# Patient Record
Sex: FEMALE | Race: BLACK OR AFRICAN AMERICAN | NOT HISPANIC OR LATINO | Employment: OTHER | URBAN - METROPOLITAN AREA
[De-identification: names, ages, dates, MRNs, and addresses within clinical notes are randomized per-mention and may not be internally consistent; named-entity substitution may affect disease eponyms.]

---

## 2023-10-02 ENCOUNTER — OFFICE VISIT (OUTPATIENT)
Dept: CARDIOLOGY | Facility: HOSPITAL | Age: 73
End: 2023-10-02
Payer: MEDICARE

## 2023-10-02 VITALS
DIASTOLIC BLOOD PRESSURE: 73 MMHG | BODY MASS INDEX: 30.97 KG/M2 | HEIGHT: 64 IN | HEART RATE: 55 BPM | OXYGEN SATURATION: 97 % | WEIGHT: 181.4 LBS | SYSTOLIC BLOOD PRESSURE: 134 MMHG

## 2023-10-02 DIAGNOSIS — I10 ESSENTIAL HYPERTENSION: ICD-10-CM

## 2023-10-02 DIAGNOSIS — E05.00 GRAVES DISEASE: ICD-10-CM

## 2023-10-02 DIAGNOSIS — R00.2 PALPITATIONS: Primary | ICD-10-CM

## 2023-10-02 PROCEDURE — 99213 OFFICE O/P EST LOW 20 MIN: CPT | Performed by: INTERNAL MEDICINE

## 2023-10-02 PROCEDURE — 1159F MED LIST DOCD IN RCRD: CPT | Performed by: INTERNAL MEDICINE

## 2023-10-02 PROCEDURE — 99203 OFFICE O/P NEW LOW 30 MIN: CPT | Performed by: INTERNAL MEDICINE

## 2023-10-02 PROCEDURE — 3078F DIAST BP <80 MM HG: CPT | Performed by: INTERNAL MEDICINE

## 2023-10-02 PROCEDURE — 3075F SYST BP GE 130 - 139MM HG: CPT | Performed by: INTERNAL MEDICINE

## 2023-10-02 RX ORDER — LEVOTHYROXINE SODIUM 88 UG/1
1 CAPSULE ORAL
COMMUNITY

## 2023-10-02 RX ORDER — MULTIVITAMIN
1 TABLET ORAL DAILY
COMMUNITY

## 2023-10-02 RX ORDER — ATORVASTATIN CALCIUM 10 MG/1
10 TABLET, FILM COATED ORAL DAILY
COMMUNITY

## 2023-10-02 RX ORDER — METOPROLOL TARTRATE 50 MG/1
1 TABLET ORAL SEE ADMIN INSTRUCTIONS
COMMUNITY

## 2023-10-02 RX ORDER — CYANOCOBALAMIN (VITAMIN B-12) 250 MCG
250 TABLET ORAL DAILY
COMMUNITY

## 2023-10-02 RX ORDER — LISINOPRIL AND HYDROCHLOROTHIAZIDE 20; 25 MG/1; MG/1
1 TABLET ORAL DAILY
COMMUNITY

## 2023-10-02 ASSESSMENT — ENCOUNTER SYMPTOMS
DEPRESSION: 0
OCCASIONAL FEELINGS OF UNSTEADINESS: 0
LOSS OF SENSATION IN FEET: 0

## 2023-10-02 NOTE — PROGRESS NOTES
[facility] [place]       Primary Care Physician: Deidra Snyder, APRN-CNP  Date of Visit: 10/02/2023  1:40 PM EDT     HPI / Summary:   Clarisa Mitchell is a 73 y.o. female with HTN, HLD, Graves, glaucoma and prior palpitations/NSVT who presents for evaluation.     Seen previously at [facility] by electrophysiologist Dr. Mobley for occasional palpitations. Supposedly had a normal echo and stress test in the past. Was on metoprolol PRN. He advised her to come back PRN.    She reports having palpitations since the . She can go 2-3 months without palpitations. When they do happen they can last <15 minutes. She is now on a plant based diet. Her recent thyroid function tests showed a TSH of 6 and Free T4 of 1.29. She is planning to see her PCP in 2023 for repeat blood work.     ROS: Full 10 pt review of symptoms of negative unless discussed above.     Problems:   There is no problem list on file for this patient.    Medical History:   No past medical history on file.    Surgical Hx:   No past surgical history on file.     Family Hx:   No family history on file.   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  EtOH/Smoking/Drugs: occasional alcohol, no smoking, no drugs    Medications  Current Outpatient Medications   Medication Instructions    atorvastatin (LIPITOR) 10 mg, oral, Daily    cyanocobalamin (VITAMIN B-12) 250 mcg, oral, Daily    levothyroxine (Tirosint) 88 mcg capsule 1 capsule, oral, Daily before breakfast    lisinopriL-hydrochlorothiazide 20-25 mg tablet 1 tablet, oral, Daily    metoprolol tartrate (Lopressor) 50 mg tablet 1 tablet, oral, See admin instructions, Take PRN for palpitations    multivitamin tablet 1 tablet, oral,  "Daily       Allergies  Patient has no allergy information on record.    Exam:   Vitals: /73 (BP Location: Right arm, Patient Position: Sitting, BP Cuff Size: Adult long)   Pulse 55   Ht 1.626 m (5' 4\")   Wt 82.3 kg (181 lb 6.4 oz)   SpO2 97%   BMI 31.14 kg/m²   Wt Readings from Last 5 Encounters:   10/02/23 82.3 kg (181 lb 6.4 oz)     GEN: Pleasant, well-appearing, no acute distress.  HEENT: JVP not elevated  CHEST: Clear to auscultation, No wheeze, good air movement.  CV: bradycardic, normal rhythm, no murmurs or rubs  ABD: Soft  EXT: Warm, well perfused, No LE edema.   NEURO: grossly non focal  SKIN: No obvious rashes     Labs:   Lipids  No results found for: \"CHOL\"  No results found for: \"HDL\"  No results found for: \"LDLCALC\"  No results found for: \"TRIG\"  No components found for: \"CHOLHDL\"    Hemoglobin A1C  No results found for: \"HGBA1C\"    BMP  Lab Results   Component Value Date    GLUCOSE 95 04/05/2021    CALCIUM 8.4 (L) 04/05/2021     (L) 04/05/2021    K 4.9 04/05/2021    CO2 28 04/05/2021    CL 92 (L) 04/05/2021    BUN 6 04/05/2021    CREATININE 0.74 04/05/2021         Notable Studies: imaging personally reviewed and summarized by me below  EKG:  10/2/2023: sinus bradycardia, nonspecific T wave flattening      Assessment and Plan  Clarisa Mitchell is a 73 y.o. female with HTN, HLD, Graves, glaucoma and prior palpitations/NSVT who presents for evaluation.     She has had occasional palpitations since the 1980s. She has reportedly had an echo and stress test in the past which were normal. She saw an electrophysiologist at [facility] in 10/2021 who advised her to return PRN.     She is not actively having symptoms but wanted to be pro-active. She has palpitations that are short-lasting every few months. Her recent thyroid function tests are acceptable. Her ECG shows sinus bradycardia with non specific T wave flattening. No murmur or other concerning findings on physical exam. She can continue to take " the metoprolol PRN for palpitations and return in 1 year for a follow up visit.     Gustavo Arroyo MD

## 2023-10-26 ENCOUNTER — APPOINTMENT (OUTPATIENT)
Dept: RADIOLOGY | Facility: CLINIC | Age: 73
End: 2023-10-26
Payer: COMMERCIAL

## 2023-10-27 ENCOUNTER — ANCILLARY PROCEDURE (OUTPATIENT)
Dept: RADIOLOGY | Facility: CLINIC | Age: 73
End: 2023-10-27
Payer: MEDICARE

## 2023-10-27 DIAGNOSIS — Z12.31 ENCOUNTER FOR SCREENING MAMMOGRAM FOR MALIGNANT NEOPLASM OF BREAST: ICD-10-CM

## 2023-10-27 PROCEDURE — 77063 BREAST TOMOSYNTHESIS BI: CPT | Mod: BILATERAL PROCEDURE | Performed by: RADIOLOGY

## 2023-10-27 PROCEDURE — 77067 SCR MAMMO BI INCL CAD: CPT

## 2023-10-27 PROCEDURE — 77067 SCR MAMMO BI INCL CAD: CPT | Mod: BILATERAL PROCEDURE | Performed by: RADIOLOGY

## 2024-10-04 NOTE — PROGRESS NOTES
[facility] [place]       Primary Care Physician: Deidra Snyder, APRN-CNP  Date of Visit: 10/07/2024  9:00 AM EDT     HPI / Summary:   lCarisa Mitchell is a 74 y.o. female with HTN, HLD, Graves, glaucoma and prior palpitations/NSVT who presents for follow up.     Seen previously at [facility] by electrophysiologist Dr. Mobley for occasional palpitations. Supposedly had a normal echo and stress test in the past. Was on metoprolol PRN. At the initial visit she reported having palpitations since the . She could go 2-3 months without palpitations. When they do happen they can last <15 minutes. She is now on a plant based diet. Her thyroid function tests at the time showed a TSH of 6 and Free T4 of 1.29. At the last visit she wasn't actively having symptoms, and as such clinical observation was recommended.     Since the last visit she has been doing very well. No issues.     ROS: Full 10 pt review of symptoms of negative unless discussed above.     Problems:   There is no problem list on file for this patient.    Medical History:   No past medical history on file.    Surgical Hx:   No past surgical history on file.     Family Hx:   No family history on file.   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    Exam:   Vitals: /75 (BP Location: Right arm, Patient Position: Sitting)   Pulse 55   Ht 1.626 m (5' 4\")   Wt 79.4 kg (175 lb)   BMI 30.04 kg/m²   Wt Readings from Last 5 Encounters:   10/07/24 79.4 kg (175 lb)   10/02/23 82.3 kg (181 lb 6.4 oz)     GEN: Pleasant, well-appearing, no acute distress.  HEENT: JVP not elevated  CHEST: Clear to auscultation, No wheeze, good air movement.  CV: bradycardic, normal rhythm, no murmurs or rubs  ABD: Soft  EXT: Warm, well perfused, No LE edema.   NEURO: grossly non focal  SKIN: No obvious rashes     Labs:   Lipids  No results found for: \"CHOL\"  No results found for: \"HDL\"  No results found for: \"LDLCALC\"  No results found for: \"TRIG\"  No components found for: \"CHOLHDL\"    Hemoglobin A1C  No results found for: \"HGBA1C\"    BMP  Lab Results   Component Value Date    GLUCOSE 95 04/05/2021    CALCIUM 8.4 (L) 04/05/2021     (L) 04/05/2021    K 4.9 04/05/2021    CO2 28 04/05/2021    CL 92 (L) 04/05/2021    BUN 6 04/05/2021    CREATININE 0.74 04/05/2021         Notable Studies: imaging personally reviewed and summarized by me below  EKG:  10/2/2023: sinus bradycardia, nonspecific T wave flattening  10/7/2024: sinus bradycardia, HR 55, non specific T wave flattening.     Assessment and Plan  Clarisa Mitchell is a 74 y.o. female with HTN, HLD, Graves, glaucoma and prior palpitations/NSVT who presents for evaluation.     She has had occasional palpitations since the 1980s. She has reportedly had an echo and stress test in the past which were normal. She saw an electrophysiologist at [facility] in 10/2021 who advised her to return PRN.     She is not actively having symptoms but wanted to be pro-active. She has palpitations that are short-lasting every few months. Her ECG shows sinus bradycardia with non specific T wave flattening. No murmur or " other concerning findings on physical exam. She can continue to take the metoprolol PRN for palpitations and return in 1 year for a follow up visit.     Gustavo Arroyo MD

## 2024-10-07 ENCOUNTER — OFFICE VISIT (OUTPATIENT)
Dept: CARDIOLOGY | Facility: HOSPITAL | Age: 74
End: 2024-10-07
Payer: MEDICARE

## 2024-10-07 VITALS
HEIGHT: 64 IN | HEART RATE: 55 BPM | DIASTOLIC BLOOD PRESSURE: 75 MMHG | BODY MASS INDEX: 29.88 KG/M2 | SYSTOLIC BLOOD PRESSURE: 127 MMHG | WEIGHT: 175 LBS

## 2024-10-07 DIAGNOSIS — R00.2 PALPITATIONS: ICD-10-CM

## 2024-10-07 PROCEDURE — 93005 ELECTROCARDIOGRAM TRACING: CPT | Performed by: INTERNAL MEDICINE

## 2024-10-07 PROCEDURE — 99213 OFFICE O/P EST LOW 20 MIN: CPT | Performed by: INTERNAL MEDICINE

## 2024-10-07 PROCEDURE — 1159F MED LIST DOCD IN RCRD: CPT | Performed by: INTERNAL MEDICINE

## 2024-10-07 PROCEDURE — 1036F TOBACCO NON-USER: CPT | Performed by: INTERNAL MEDICINE

## 2024-10-07 PROCEDURE — 3008F BODY MASS INDEX DOCD: CPT | Performed by: INTERNAL MEDICINE

## 2024-10-07 PROCEDURE — 93010 ELECTROCARDIOGRAM REPORT: CPT | Performed by: INTERNAL MEDICINE

## 2024-10-07 ASSESSMENT — ENCOUNTER SYMPTOMS
OCCASIONAL FEELINGS OF UNSTEADINESS: 0
DEPRESSION: 0
LOSS OF SENSATION IN FEET: 0

## 2024-10-08 LAB
ATRIAL RATE: 55 BPM
P AXIS: 40 DEGREES
P OFFSET: 204 MS
P ONSET: 149 MS
PR INTERVAL: 160 MS
Q ONSET: 229 MS
QRS COUNT: 9 BEATS
QRS DURATION: 84 MS
QT INTERVAL: 410 MS
QTC CALCULATION(BAZETT): 392 MS
QTC FREDERICIA: 398 MS
R AXIS: 57 DEGREES
T AXIS: 21 DEGREES
T OFFSET: 434 MS
VENTRICULAR RATE: 55 BPM

## 2024-10-22 ENCOUNTER — HOSPITAL ENCOUNTER (OUTPATIENT)
Dept: RADIOLOGY | Facility: HOSPITAL | Age: 74
Discharge: HOME | End: 2024-10-22
Payer: MEDICARE

## 2024-10-22 VITALS — WEIGHT: 173 LBS | BODY MASS INDEX: 29.53 KG/M2 | HEIGHT: 64 IN

## 2024-10-22 DIAGNOSIS — Z12.31 ENCOUNTER FOR SCREENING MAMMOGRAM FOR MALIGNANT NEOPLASM OF BREAST: ICD-10-CM

## 2024-10-22 PROCEDURE — 77067 SCR MAMMO BI INCL CAD: CPT
